# Patient Record
Sex: FEMALE | Race: AMERICAN INDIAN OR ALASKA NATIVE | HISPANIC OR LATINO | Employment: FULL TIME | URBAN - METROPOLITAN AREA
[De-identification: names, ages, dates, MRNs, and addresses within clinical notes are randomized per-mention and may not be internally consistent; named-entity substitution may affect disease eponyms.]

---

## 2017-05-01 ENCOUNTER — TELEPHONE (OUTPATIENT)
Dept: FAMILY MEDICINE | Facility: CLINIC | Age: 52
End: 2017-05-01

## 2017-05-01 NOTE — TELEPHONE ENCOUNTER
5/1/2017     Call Regarding Preventive Health Screening Mammogram  Attempt 1     Message No longer FV due to insurance  Comments:         Outreach   SIMONA

## 2017-08-05 ENCOUNTER — APPOINTMENT (OUTPATIENT)
Dept: ULTRASOUND IMAGING | Facility: CLINIC | Age: 52
End: 2017-08-05
Attending: INTERNAL MEDICINE
Payer: COMMERCIAL

## 2017-08-05 ENCOUNTER — HOSPITAL ENCOUNTER (EMERGENCY)
Facility: CLINIC | Age: 52
Discharge: HOME | End: 2017-08-05
Attending: INTERNAL MEDICINE | Admitting: INTERNAL MEDICINE
Payer: COMMERCIAL

## 2017-08-05 VITALS
HEART RATE: 66 BPM | SYSTOLIC BLOOD PRESSURE: 137 MMHG | BODY MASS INDEX: 24.8 KG/M2 | RESPIRATION RATE: 14 BRPM | WEIGHT: 156 LBS | DIASTOLIC BLOOD PRESSURE: 87 MMHG | TEMPERATURE: 96.6 F | OXYGEN SATURATION: 98 %

## 2017-08-05 DIAGNOSIS — R25.2 THIGH CRAMP: ICD-10-CM

## 2017-08-05 DIAGNOSIS — M79.652 LEFT THIGH PAIN: ICD-10-CM

## 2017-08-05 LAB
ANION GAP SERPL CALCULATED.3IONS-SCNC: 7 MMOL/L (ref 3–14)
BASOPHILS # BLD AUTO: 0 10E9/L (ref 0–0.2)
BASOPHILS NFR BLD AUTO: 0.2 %
BUN SERPL-MCNC: 11 MG/DL (ref 7–30)
CALCIUM SERPL-MCNC: 8.5 MG/DL (ref 8.5–10.1)
CHLORIDE SERPL-SCNC: 110 MMOL/L (ref 94–109)
CO2 SERPL-SCNC: 26 MMOL/L (ref 20–32)
CREAT SERPL-MCNC: 0.82 MG/DL (ref 0.52–1.04)
DIFFERENTIAL METHOD BLD: ABNORMAL
EOSINOPHIL # BLD AUTO: 0.1 10E9/L (ref 0–0.7)
EOSINOPHIL NFR BLD AUTO: 1.4 %
ERYTHROCYTE [DISTWIDTH] IN BLOOD BY AUTOMATED COUNT: 14.1 % (ref 10–15)
GFR SERPL CREATININE-BSD FRML MDRD: 73 ML/MIN/1.7M2
GLUCOSE SERPL-MCNC: 101 MG/DL (ref 70–99)
HCT VFR BLD AUTO: 36.5 % (ref 35–47)
HGB BLD-MCNC: 11.7 G/DL (ref 11.7–15.7)
IMM GRANULOCYTES # BLD: 0 10E9/L (ref 0–0.4)
IMM GRANULOCYTES NFR BLD: 0.5 %
INR PPP: 1 (ref 0.86–1.14)
LYMPHOCYTES # BLD AUTO: 1.7 10E9/L (ref 0.8–5.3)
LYMPHOCYTES NFR BLD AUTO: 19.6 %
MAGNESIUM SERPL-MCNC: 2 MG/DL (ref 1.6–2.3)
MCH RBC QN AUTO: 25.1 PG (ref 26.5–33)
MCHC RBC AUTO-ENTMCNC: 32.1 G/DL (ref 31.5–36.5)
MCV RBC AUTO: 78 FL (ref 78–100)
MONOCYTES # BLD AUTO: 0.5 10E9/L (ref 0–1.3)
MONOCYTES NFR BLD AUTO: 5.9 %
NEUTROPHILS # BLD AUTO: 6.1 10E9/L (ref 1.6–8.3)
NEUTROPHILS NFR BLD AUTO: 72.4 %
NRBC # BLD AUTO: 0 10*3/UL
NRBC BLD AUTO-RTO: 0 /100
PLATELET # BLD AUTO: 204 10E9/L (ref 150–450)
POTASSIUM SERPL-SCNC: 3.8 MMOL/L (ref 3.4–5.3)
RBC # BLD AUTO: 4.67 10E12/L (ref 3.8–5.2)
SODIUM SERPL-SCNC: 143 MMOL/L (ref 133–144)
WBC # BLD AUTO: 8.5 10E9/L (ref 4–11)

## 2017-08-05 PROCEDURE — 80048 BASIC METABOLIC PNL TOTAL CA: CPT | Performed by: INTERNAL MEDICINE

## 2017-08-05 PROCEDURE — 99284 EMERGENCY DEPT VISIT MOD MDM: CPT | Mod: 25

## 2017-08-05 PROCEDURE — 83735 ASSAY OF MAGNESIUM: CPT | Performed by: INTERNAL MEDICINE

## 2017-08-05 PROCEDURE — 85025 COMPLETE CBC W/AUTO DIFF WBC: CPT | Performed by: INTERNAL MEDICINE

## 2017-08-05 PROCEDURE — 93971 EXTREMITY STUDY: CPT | Mod: LT

## 2017-08-05 PROCEDURE — 85610 PROTHROMBIN TIME: CPT | Performed by: INTERNAL MEDICINE

## 2017-08-05 PROCEDURE — 99283 EMERGENCY DEPT VISIT LOW MDM: CPT | Mod: Z6 | Performed by: INTERNAL MEDICINE

## 2017-08-05 ASSESSMENT — ENCOUNTER SYMPTOMS
CHILLS: 0
PALPITATIONS: 0
BACK PAIN: 0
WHEEZING: 0
WEAKNESS: 0
CHEST TIGHTNESS: 0
DIFFICULTY URINATING: 0
SHORTNESS OF BREATH: 0
COUGH: 0
FEVER: 0
ADENOPATHY: 0
CONFUSION: 0
NUMBNESS: 0
ABDOMINAL PAIN: 0
NAUSEA: 0

## 2017-08-05 NOTE — ED AVS SNAPSHOT
[facility], Emergency Department    [address]    [place] [address]    Phone:  [phone]    Fax:  [phone]                                       Lucretia Patterson   MRN: [id number]    Department:  [facility], Emergency Department   Date of Visit:  8/5/2017           Patient Information     Date Of Birth          1965        Your diagnoses for this visit were:     Thigh cramp        You were seen by Korey Cardozo MD.      Follow-up Information     Follow up with Reema Mathew APRN CNP.    Specialty:  Nurse Practitioner    Contact information:    [facility]  [address]  [place] [id number] [phone]          Discharge Instructions       Ibuprofen (advil) 400-600 mg every 6 hours as needed for pain.  Follow up with your doctors at [facility].  Return for new or worsening symptoms, leg swelling, chest pain, shortness of breath, or palpitations.    24 Hour Appointment Hotline       To make an appointment at any [facility], call [phone] ([phone]). If you don't have a family doctor or clinic, we will help you find one. [facility] are conveniently located to serve the needs of you and your family.             Review of your medicines      Our records show that you are taking the medicines listed below. If these are incorrect, please call your family doctor or clinic.        Dose / Directions Last dose taken    amoxicillin 500 MG capsule   Commonly known as:  AMOXIL   Dose:  1000 mg   Quantity:  54 capsule        Take 2 capsules (1,000 mg) by mouth 2 times daily   Refills:  0        bisacodyl 10 MG Suppository   Commonly known as:  DULCOLAX   Dose:  10 mg   Quantity:  25 suppository        Place 1 suppository (10 mg) rectally daily as needed for constipation   Refills:  1        cholecalciferol 1000 UNIT tablet   Commonly known as:  vitamin D   Dose:  1000 Units   Quantity:  100 tablet        Take 1  tablet (1,000 Units) by mouth 2 times daily   Refills:  3        clarithromycin 500 MG tablet   Commonly known as:  BIAXIN   Dose:  500 mg   Quantity:  28 tablet        Take 1 tablet (500 mg) by mouth 2 times daily   Refills:  0        FISH OIL PO        Take  by mouth daily. Fish oil with vitamin D   Refills:  0        LANsoprazole 30 MG CR capsule   Commonly known as:  PREVACID   Dose:  30 mg   Quantity:  28 capsule        Take 1 capsule (30 mg) by mouth 2 times daily Take 30-60 minutes before a meal.   Refills:  0        PROBIOTIC DAILY PO        Refills:  0        TURMERIC PO        Refills:  0                Procedures and tests performed during your visit     Basic metabolic panel    CBC with platelets differential    INR    Magnesium    US Lower Extremity Venous Duplex Left      Orders Needing Specimen Collection     None      Pending Results     No orders found from 8/3/2017 to 8/6/2017.            Pending Culture Results     No orders found from 8/3/2017 to 8/6/2017.            Pending Results Instructions     If you had any lab results that were not finalized at the time of your Discharge, you can call the ED Lab Result RN at [phone]. You will be contacted by this team for any positive Lab results or changes in treatment. The nurses are available 7 days a week from 10A to 6:30P.  You can leave a message 24 hours per day and they will return your call.        Thank you for choosing [facility]       Thank you for choosing [facility] for your care. Our goal is always to provide you with excellent care. Hearing back from our patients is one way we can continue to improve our services. Please take a few minutes to complete the written survey that you may receive in the mail after you visit with us. Thank you!        [employer] Information     [employer] gives you secure access to your electronic health record. If you see a primary care provider, you can also send messages to your care team and make appointments. If  you have questions, please call your primary care clinic.  If you do not have a primary care provider, please call [phone] and they will assist you.        Care EveryWhere ID     This is your Care EveryWhere ID. This could be used by other organizations to access your [facility] medical records  [id number]        Equal Access to Services     JUANY MANN : Doug Do, miki martinez, nidia fernandez. So [facility] [phone].    ATENCIÓN: Si habla español, tiene a arenas disposición servicios gratuitos de asistencia lingüística. Llame al [phone].    We comply with applicable federal civil rights laws and [place] laws. We do not discriminate on the basis of race, color, national origin, age, disability sex, sexual orientation or gender identity.            After Visit Summary       This is your record. Keep this with you and show to your community pharmacist(s) and doctor(s) at your next visit.

## 2017-08-05 NOTE — ED AVS SNAPSHOT
[facility], Emergency Department    [address]    [place] [address]    Phone:  [phone]    Fax:  [phone]                                       Lucretia Patterson   MRN: [id number]    Department:  [facility], Emergency Department   Date of Visit:  8/5/2017           After Visit Summary Signature Page     I have received my discharge instructions, and my questions have been answered. I have discussed any challenges I see with this plan with the nurse or doctor.    ..........................................................................................................................................  Patient/Patient Representative Signature      ..........................................................................................................................................  Patient Representative Print Name and Relationship to Patient    ..................................................               ................................................  Date                                            Time    ..........................................................................................................................................  Reviewed by Signature/Title    ...................................................              ..............................................  Date                                                            Time

## 2017-08-06 NOTE — ED PROVIDER NOTES
"  History     Chief Complaint   Patient presents with     Leg Pain     Onset yesterday with left upper leg pain, \"spasms,\" here to rule out blood clot, recently stopped tamoxifen.     HPI  Lucretia Patterson is a 52 year old female who presents with left thigh cramps and spasms for a couple of days. She states she has a recent history of breast cancer diagnosis treated at [facility]. She had a left breast lumpectomy and sentinel lymph node dissection in May, followed by 4 weeks of radiation, then Tamoxifen for the past 2 months. She stopped Tamoxifen last week due to side effects (UTI and respiratory infections). She has no thigh, calf or foot swelling. She has no chest pain, palpitations or shortness of breath. She has no fever, chills or sweats.     PAST MEDICAL HISTORY:   Past Medical History:   Diagnosis Date     Cancer (H)      Constipation      Other anxiety states      Rosacea        PAST SURGICAL HISTORY:   Past Surgical History:   Procedure Laterality Date     CARDIAC EVENT MONITOR - PEDS/ADULT       COLONOSCOPY  7/2010    Polyp removed.  repeat 3 y     COLONOSCOPY  4/16/2014    Procedure: COMBINED COLONOSCOPY, SINGLE BIOPSY/POLYPECTOMY BY BIOPSY;  Surgeon: Luciano Braga MD;  Location:  GI     COLONOSCOPY N/A 12/22/2016    Procedure: COLONOSCOPY;  Surgeon: Uri Guillen MD;  Location:  GI     ESOPHAGOSCOPY, GASTROSCOPY, DUODENOSCOPY (EGD), COMBINED N/A 12/22/2016    Procedure: COMBINED ESOPHAGOSCOPY, GASTROSCOPY, DUODENOSCOPY (EGD);  Surgeon: Uri Guillen MD;  Location:  GI     ESOPHAGOSCOPY, GASTROSCOPY, DUODENOSCOPY (EGD), COMBINED N/A 12/22/2016    Procedure: COMBINED ESOPHAGOSCOPY, GASTROSCOPY, DUODENOSCOPY (EGD), BIOPSY SINGLE OR MULTIPLE;  Surgeon: Uri Guillen MD;  Location:  GI     SOFT TISSUE SURGERY       SURGICAL HISTORY OF -       Dulce's tendon repair R        FAMILY HISTORY:   Family History   Problem Relation Age of Onset "     HEART DISEASE Paternal Grandfather      CEREBROVASCULAR DISEASE Paternal Grandmother      DIABETES Paternal Grandmother      DIABETES Mother      CANCER Mother 73     small intestinal cancer     Psychotic Disorder Mother      Gynecology Mother      cyst on ovary     Hypertension Father      C.A.D. Father      heart bypass double     DIABETES Father      Cancer - colorectal Father 69     CANCER Paternal Aunt      stomach       SOCIAL HISTORY:   Social History   Substance Use Topics     Smoking status: Former Smoker     Types: Cigarettes     Quit date: 12/30/2010     Smokeless tobacco: Never Used      Comment: 1/2 cigevery other day     Alcohol use Yes      Comment: 1/2 glass wine on occasion         I have reviewed the Medications, Allergies, Past Medical and Surgical History, and Social History in the Epic system.    Review of Systems   Constitutional: Negative for chills and fever.   HENT: Negative for congestion.    Eyes: Negative for visual disturbance.   Respiratory: Negative for cough, chest tightness, shortness of breath and wheezing.    Cardiovascular: Negative for chest pain, palpitations and leg swelling.   Gastrointestinal: Negative for abdominal pain and nausea.   Genitourinary: Negative for difficulty urinating.   Musculoskeletal: Negative for back pain.   Skin: Negative for rash.   Neurological: Negative for weakness and numbness.   Hematological: Negative for adenopathy.   Psychiatric/Behavioral: Negative for confusion.       Physical Exam   BP: 145/83  Pulse: 74  Heart Rate: 74  Temp: 96.6  F (35.9  C)  Resp: 16  Weight: 70.8 kg (156 lb)  SpO2: 99 %  Physical Exam   Constitutional: She is oriented to person, place, and time. She appears well-developed and well-nourished. No distress.   HENT:   Head: Normocephalic and atraumatic.   Right Ear: External ear normal.   Left Ear: External ear normal.   Mouth/Throat: Oropharynx is clear and moist.   Eyes: Conjunctivae and EOM are normal. Pupils are  equal, round, and reactive to light. No scleral icterus.   Neck: Normal range of motion. Neck supple.   Cardiovascular: Normal rate, regular rhythm and normal heart sounds.    No murmur heard.  Pulmonary/Chest: Effort normal and breath sounds normal. She has no wheezes. She has no rales.   Abdominal: Soft. Bowel sounds are normal. There is no tenderness. There is no rebound.   Musculoskeletal: She exhibits no edema or tenderness.   Neurological: She is alert and oriented to person, place, and time. She displays normal reflexes. No cranial nerve deficit. Coordination normal.   Skin: Skin is warm and dry.   Psychiatric: She has a normal mood and affect. Her behavior is normal.   Nursing note and vitals reviewed.      ED Course     ED Course     Procedures        Labs/Imaging    Results for orders placed or performed during the hospital encounter of 08/05/17 (from the past 24 hour(s))   CBC with platelets differential   Result Value Ref Range    WBC 8.5 4.0 - 11.0 10e9/L    RBC Count 4.67 3.8 - 5.2 10e12/L    Hemoglobin 11.7 11.7 - 15.7 g/dL    Hematocrit 36.5 35.0 - 47.0 %    MCV 78 78 - 100 fl    MCH 25.1 (L) 26.5 - 33.0 pg    MCHC 32.1 31.5 - 36.5 g/dL    RDW 14.1 10.0 - 15.0 %    Platelet Count 204 150 - 450 10e9/L    Diff Method Automated Method     % Neutrophils 72.4 %    % Lymphocytes 19.6 %    % Monocytes 5.9 %    % Eosinophils 1.4 %    % Basophils 0.2 %    % Immature Granulocytes 0.5 %    Nucleated RBCs 0 0 /100    Absolute Neutrophil 6.1 1.6 - 8.3 10e9/L    Absolute Lymphocytes 1.7 0.8 - 5.3 10e9/L    Absolute Monocytes 0.5 0.0 - 1.3 10e9/L    Absolute Eosinophils 0.1 0.0 - 0.7 10e9/L    Absolute Basophils 0.0 0.0 - 0.2 10e9/L    Abs Immature Granulocytes 0.0 0 - 0.4 10e9/L    Absolute Nucleated RBC 0.0    Basic metabolic panel   Result Value Ref Range    Sodium 143 133 - 144 mmol/L    Potassium 3.8 3.4 - 5.3 mmol/L    Chloride 110 (H) 94 - 109 mmol/L    Carbon Dioxide 26 20 - 32 mmol/L    Anion Gap 7 3 - 14  mmol/L    Glucose 101 (H) 70 - 99 mg/dL    Urea Nitrogen 11 7 - 30 mg/dL    Creatinine 0.82 0.52 - 1.04 mg/dL    GFR Estimate 73 >60 mL/min/1.7m2    GFR Estimate If Black 88 >60 mL/min/1.7m2    Calcium 8.5 8.5 - 10.1 mg/dL   Magnesium   Result Value Ref Range    Magnesium 2.0 1.6 - 2.3 mg/dL   INR   Result Value Ref Range    INR 1.00 0.86 - 1.14   US Lower Extremity Venous Duplex Left    Narrative    US LOWER EXTREMITY VENOUS DUPLEX LEFT  8/5/2017 10:29 PM    HISTORY:  left thigh pain    TECHNIQUE:  Venous Doppler US including color flow and Doppler  waveform analysis.    FINDINGS: No thrombus was observed and there was normal  compressibility, phasic flow, and augmentation.       Impression    IMPRESSION: No evidence of  left lower extremity deep venous  thrombosis.    KATIE ROMERO MD         Assessments & Plan (with Medical Decision Making)   Impression:  Young woman with recent treatment for local breast cancer in the left breast with adjuvant radiation and tamoxifen.  She presents with left thigh cramps since discontinuing Tamoxifen.. There is no evidence of DVT. Her symptoms are most likely related to transient tissue fluid shift related to discontinuation of the hormonal therapy. No DVT on venous doppler US and normal electrolytes.    I have reviewed the nursing notes.    I have reviewed the findings, diagnosis, plan and need for follow up with the patient.    New Prescriptions    No medications on file       Final diagnoses:   Thigh cramp       8/5/2017   [facility], [place], EMERGENCY DEPARTMENT     Korey Cardozo MD  08/05/17 5008

## 2017-12-23 ENCOUNTER — HEALTH MAINTENANCE LETTER (OUTPATIENT)
Age: 52
End: 2017-12-23

## 2019-10-02 ENCOUNTER — HEALTH MAINTENANCE LETTER (OUTPATIENT)
Age: 54
End: 2019-10-02

## 2019-12-16 ENCOUNTER — HEALTH MAINTENANCE LETTER (OUTPATIENT)
Age: 54
End: 2019-12-16

## 2021-01-15 ENCOUNTER — HEALTH MAINTENANCE LETTER (OUTPATIENT)
Age: 56
End: 2021-01-15

## 2021-09-04 ENCOUNTER — HEALTH MAINTENANCE LETTER (OUTPATIENT)
Age: 56
End: 2021-09-04

## 2021-10-30 ENCOUNTER — HEALTH MAINTENANCE LETTER (OUTPATIENT)
Age: 56
End: 2021-10-30

## 2022-02-19 ENCOUNTER — HEALTH MAINTENANCE LETTER (OUTPATIENT)
Age: 57
End: 2022-02-19

## 2022-10-22 ENCOUNTER — HEALTH MAINTENANCE LETTER (OUTPATIENT)
Age: 57
End: 2022-10-22

## 2023-04-01 ENCOUNTER — HEALTH MAINTENANCE LETTER (OUTPATIENT)
Age: 58
End: 2023-04-01

## 2023-11-05 ENCOUNTER — HEALTH MAINTENANCE LETTER (OUTPATIENT)
Age: 58
End: 2023-11-05

## 2024-04-03 ENCOUNTER — TRANSFERRED RECORDS (OUTPATIENT)
Dept: HEALTH INFORMATION MANAGEMENT | Facility: CLINIC | Age: 59
End: 2024-04-03
Payer: COMMERCIAL

## 2024-04-03 ENCOUNTER — TRANSCRIBE ORDERS (OUTPATIENT)
Dept: OTHER | Age: 59
End: 2024-04-03

## 2024-04-03 DIAGNOSIS — M89.9 BONE LESION: Primary | ICD-10-CM

## 2024-04-03 DIAGNOSIS — R10.31 RIGHT LOWER QUADRANT PAIN: ICD-10-CM

## 2024-04-04 ENCOUNTER — PATIENT OUTREACH (OUTPATIENT)
Dept: ONCOLOGY | Facility: CLINIC | Age: 59
End: 2024-04-04
Payer: COMMERCIAL

## 2024-04-04 ENCOUNTER — TRANSCRIBE ORDERS (OUTPATIENT)
Dept: OTHER | Age: 59
End: 2024-04-04

## 2024-04-04 ENCOUNTER — PRE VISIT (OUTPATIENT)
Dept: ONCOLOGY | Facility: CLINIC | Age: 59
End: 2024-04-04
Payer: COMMERCIAL

## 2024-04-04 DIAGNOSIS — C90.00 MULTIPLE MYELOMA NOT HAVING ACHIEVED REMISSION (H): Primary | ICD-10-CM

## 2024-04-04 DIAGNOSIS — M89.9 BONE LESION: ICD-10-CM

## 2024-04-04 NOTE — PROGRESS NOTES
New Patient Oncology Nurse Navigator Note     Referring provider: Self Referral      Referring Clinic/Organization: NA     Referred to (specialty:) Hematology/Oncology     Requested provider (if applicable): NA     Date Referral Received: April 4, 2024     Evaluation for:    M89.9 (ICD-10-CM) - Bone lesion   C90.00 (ICD-10-CM) - Multiple myeloma not having achieved remission (H)     Clinical History (per Nurse review of records provided):      Patient was seen by Dr. Ananda Linares at [facility] [place] on 4/3/24 for evaluation of right groin and upper right thigh pain. Imaging done showing:    Imaging:  Exam: X ray: Right hip  Location: [facility]  Date: 4/3/2024  Interpretation: No fracture. Radiology saw a hypolucent lesion in the right acetabulum.  I ordered and independently read and reviewed the radiographs above; the results were discussed with the patient.    MR Hip Rt WO IV Cont  EXAM: MR HIP RT WO IV CONT  LOCATION: [facility]  DATE: 4/3/2024    INDICATION: Right hip pain / asymmetric lucency within the right ischium and right acetabular region which may represent an underlying bone lesion. Right lower quadrant pain.  COMPARISON: Radiographs from 04/03/2024.  TECHNIQUE: Unenhanced.    FINDINGS:    RIGHT HIP:  -Labrum: Posterosuperior and anterosuperior labral chondral separation. No paralabral cyst.  -Cartilage: Normal.  -Joint space: No effusion or synovitis.  -Joint capsule/ligaments: Intact joint capsule. Ligamentum teres is intact.  -Morphology: Alpha Angle is normal. No bony morphologic changes associated with femoroacetabular impingement.    MUSCLES AND TENDONS:  -Gluteal: No tendon tear or tendinopathy. No trochanteric bursitis.  -Proximal hamstring: No tendon tear or tendinopathy.  -Iliopsoas: No tendon tear or tendinopathy. No bursitis.  -Rectus femoris origin: No tear or tendinopathy.    BONES:  -There are lytic bone lesions diffusely throughout the visualized bones. There is a  large confluent lesion involving the right acetabulum measuring 2 cm transverse by 6.5 cm AP by 5 cm cephalocaudal. This is mildly expansile and breaks through the quadrilateral plate cortex and extends slightly into the space deep to the obturator internus muscle. No definite pathologic fracture.  -Lytic lesions scattered elsewhere measure up to 2 cm in diameter.    SOFT TISSUES:  -Normal muscle bulk. No acute muscular injury.    INTRA-PELVIC CONTENTS:  -Visualized portions are normal.    IMPRESSION:  1. Diffuse lytic bone lesions, consistent with metastatic disease or multiple myeloma. The largest lesion is mildly expansile and breaks through the quadrilateral plate cortex and extends slightly into the adjacent soft tissues. No definite pathologic fracture.  2. Right acetabular labral chondral separation.    XR Pelvis W Rt Lateral Hip  EXAM: XR PELVIS W RT LATERAL HIP  LOCATION: [facility]  DATE: 4/3/2024    INDICATION: R groin pain, Right lower quadrant pain  COMPARISON: None.    IMPRESSION: Both hips negative for fracture or dislocation. Pelvis negative for fracture. There is some asymmetric lucency within the right ischium and right acetabular region which may represent an underlying bone lesion. No pathologic fractures identified but follow-up examination or consideration of MRI is recommended.      Breast Cancer History:    Diagnosis:   Stage IB (pT1c pN1mi, M0) infiltrating ductal carcinoma of the left breast, grade II.  ER high positive, DE low positive, her 2 sepideh negative by FISH, Oncotype DX score 16, indicating an 11% risk of distant recurrence in 10 years     Treatment:   1.  Left partial mastectomy 03/09/2017  2.  Adjuvant radiation 04/17/2017 - 05/08/2017 total dose 4256 cGy  3.  Tamoxifen 20 mg daily 05/2017 - 07/2017. Discontinued due to severe fatigue.    Discharged from Hematology/Oncology clinic with Dr. Mansfield with  on 4/27/2022.     Records Location: [facility] EveryWhere  Records with  [facility]/Dr Ananda Linares  Previous breast records [facility]/Dr Klever Mansfield     Records Needed: NA     Additional testing needed prior to consult: ?    Payor: [employer] / Plan: [employer] FULLY INSURED / Product Type: HMO /     April 4, 2024  Referral received and message sent to [employer] to re-transcribe referral to orthopedic oncology.     Sia MIRELESN, RN   Oncology Nurse Navigator   [facility]   [phone] / [phone]

## 2024-04-06 NOTE — TELEPHONE ENCOUNTER
Action April 6, 2024 12:46 PM [place]   Action Taken Sent a request to  for imaging.       DIAGNOSIS:   Bone lesion [M89.9]  - Primary  Right lower quadrant pain [R10.31]   APPOINTMENT DATE: 04/09/2024   NOTES STATUS DETAILS   OFFICE NOTE from referring provider Care Everywhere 04/03/2024 - Ananda Linares MD -  [facility]   OFFICE NOTE from other specialist Care Everywhere 10/04/2023 - Sharon Patino MD - [facility]    [facility]    04/27/2022 - Klever Mansfield MD - [facility] [place] [facility]   MRI PACS [facility]:  04/03/2024 - RT Hip  09/14/2023, 09/02/2019 - L Spine     CT SCAN PACS [facility]:  02/10/2020 - Chest/Abd/Pel     XRAYS (IMAGES & REPORTS) PACS [facility]:  04/03/2024 - RT Hip/Pelvis

## 2024-04-09 ENCOUNTER — VIRTUAL VISIT (OUTPATIENT)
Dept: ORTHOPEDICS | Facility: CLINIC | Age: 59
End: 2024-04-09
Payer: COMMERCIAL

## 2024-04-09 ENCOUNTER — PRE VISIT (OUTPATIENT)
Dept: ORTHOPEDICS | Facility: CLINIC | Age: 59
End: 2024-04-09

## 2024-04-09 VITALS — HEIGHT: 67 IN | WEIGHT: 166 LBS | BODY MASS INDEX: 26.06 KG/M2

## 2024-04-09 DIAGNOSIS — C79.51 METASTATIC BONE TUMOR (H): Primary | ICD-10-CM

## 2024-04-09 PROCEDURE — 99203 OFFICE O/P NEW LOW 30 MIN: CPT | Mod: 95 | Performed by: ORTHOPAEDIC SURGERY

## 2024-04-09 RX ORDER — DULOXETIN HYDROCHLORIDE 20 MG/1
1 CAPSULE, DELAYED RELEASE ORAL DAILY
COMMUNITY
Start: 2024-03-05 | End: 2025-03-05

## 2024-04-09 RX ORDER — HYDROXYZINE HYDROCHLORIDE 10 MG/1
20-30 TABLET, FILM COATED ORAL
COMMUNITY
Start: 2023-08-11 | End: 2024-08-10

## 2024-04-09 RX ORDER — LORAZEPAM 0.5 MG/1
1 TABLET ORAL EVERY 8 HOURS PRN
COMMUNITY
Start: 2024-01-24

## 2024-04-09 RX ORDER — CYCLOBENZAPRINE HCL 10 MG
10 TABLET ORAL
COMMUNITY
Start: 2024-04-03 | End: 2024-05-03

## 2024-04-09 RX ORDER — FERROUS SULFATE 325(65) MG
325 TABLET ORAL
COMMUNITY
Start: 2023-08-11

## 2024-04-09 ASSESSMENT — PAIN SCALES - GENERAL: PAINLEVEL: NO PAIN (1)

## 2024-04-09 NOTE — PROGRESS NOTES
Virtual Visit Details    Type of service:  Video Visit     Impression: Suspect metastatic disease involving the right acetabulum and adjacent bones.  Currently being evaluated by oncology team or [facility].    Plan: 1.  Patient will contact us after her evaluation at [facility] is complete we will access her records in the MyChart and we will call her back with a follow-up appointment date based on those findings.  2.  Patient was instructed to use a cane on the left side or crutches at all times when bearing weight.    Patient is a 59-year-old female seen for right hip pain.  She was recently seen at [facility].  She reports the pain is been several weeks in duration.  She also complains of rib pain which is relatively new.  She is very active and feels some of her symptoms are related to her activity.    Her past history is remarkable for breast cancer treated in 2017 for what sounds like a low-grade neoplasm.  Details were not provided.    Her medications are detailed in electronic health record.    I reviewed her recent x-ray and MRI scan.  The x-ray shows a radiolucency in the medial portion of the right hip encompassing the medial acetabulum.  The MRI scan confirms the presence of this lesion and shows a cortical breakthrough along the medial wall.    I discussed the MRI Hany things with the patient and expressed concern that she most likely has a nondisplaced fracture and this is accountable for pain.    I explained to her she should continue her evaluation at [facility] and notify us when that has been completed and diagnosis is established.  At that point we will contact her about follow-up for her right hip and skeleton in general.    I advised that she use crutches or cane at all times when up.  She understands this.  She was disappointed but will comply.        This was a video visit began at 1:03 PM and ended 1:34 PM.  Total lengthof the visit was greater than 30 minutes.

## 2024-04-09 NOTE — NURSING NOTE
Patient stated high allergy to - Citalopram (Celexa)-heart palpitation, head felt like splitting open.      Is the patient currently in the state of [place]? YES    Visit mode:VIDEO    If the visit is dropped, the patient can be reconnected by: VIDEO VISIT: Send to e-mail at: [email or web address]    Will anyone else be joining the visit? NO  (If patient encounters technical issues they should call [phone])    How would you like to obtain your AVS? MyChart    Are changes needed to the allergy or medication list? Yes ADD ALLERGY- Patient stated high allergy to - Citalopram (Celexa)-heart palpitation, head felt like splitting open.    Are refills needed on medications prescribed by this physician?     Reason for visit: Consult (Wondering why got referred )    Barbara CASTILLO

## 2024-04-09 NOTE — PATIENT INSTRUCTIONS
Impression: Suspect metastatic disease involving the right acetabulum and adjacent bones.  Currently being evaluated by oncology team or [facility].    Plan: 1.  Patient will contact us after her evaluation at [facility] is complete we will access her records in the [employer] and we will call her back with a follow-up appointment date based on those findings.  2.  Patient was instructed to use a cane on the left side or crutches at all times when bearing weight.

## 2024-04-09 NOTE — LETTER
4/9/2024         RE: Lucretia Patterson  [address] [place] [address]        Dear Colleague,    Thank you for referring your patient, Lucretia Patterson, to the [facility] [place]. Please see a copy of my visit note below.    Virtual Visit Details    Type of service:  Video Visit     Impression: Suspect metastatic disease involving the right acetabulum and adjacent bones.  Currently being evaluated by oncology team or [facility].    Plan: 1.  Patient will contact us after her evaluation at [facility] is complete we will access her records in the [facility] and we will call her back with a follow-up appointment date based on those findings.  2.  Patient was instructed to use a cane on the left side or crutches at all times when bearing weight.    Patient is a 59-year-old female seen for right hip pain.  She was recently seen at [facility].  She reports the pain is been several weeks in duration.  She also complains of rib pain which is relatively new.  She is very active and feels some of her symptoms are related to her activity.    Her past history is remarkable for breast cancer treated in 2017 for what sounds like a low-grade neoplasm.  Details were not provided.    Her medications are detailed in electronic health record.    I reviewed her recent x-ray and MRI scan.  The x-ray shows a radiolucency in the medial portion of the right hip encompassing the medial acetabulum.  The MRI scan confirms the presence of this lesion and shows a cortical breakthrough along the medial wall.    I discussed the MRI Hany things with the patient and expressed concern that she most likely has a nondisplaced fracture and this is accountable for pain.    I explained to her she should continue her evaluation at [facility] and notify us when that has been completed and diagnosis is established.  At that point we will contact her about follow-up for her right hip and skeleton in general.    I advised that she  use crutches or cane at all times when up.  She understands this.  She was disappointed but will comply.    This was a video visit began at 1:03 PM and ended 1:34 PM.  Total lengthof the visit was greater than 30 minutes.        Porfirio Yee MD

## 2024-04-18 ENCOUNTER — TELEPHONE (OUTPATIENT)
Dept: ORTHOPEDICS | Facility: CLINIC | Age: 59
End: 2024-04-18
Payer: COMMERCIAL

## 2024-04-18 DIAGNOSIS — C79.51 METASTATIC BONE TUMOR (H): Primary | ICD-10-CM

## 2024-04-18 NOTE — TELEPHONE ENCOUNTER
[facility] Call Center    Phone Message    May a detailed message be left on voicemail: yes     Reason for Call: Other: Patient's physical therapist would like to know if patient is ok to do physical therapy and also they're wondering about weight bearing. OK to leave Khalida voicemail message if no answer. If ok for physical therapy please also send over order. Fax# [phone]     Action Taken: Message routed to:  [facility] ([facility]): Orthopedics    Travel Screening: Not Applicable

## 2024-04-23 ENCOUNTER — MYC MEDICAL ADVICE (OUTPATIENT)
Dept: ORTHOPEDICS | Facility: CLINIC | Age: 59
End: 2024-04-23
Payer: COMMERCIAL

## 2024-04-25 NOTE — TELEPHONE ENCOUNTER
Dr. Yee called and talked to pt. He answered pt's questions.       -Jose J, ATC- [facility]

## 2024-05-23 ENCOUNTER — TELEPHONE (OUTPATIENT)
Dept: OTHER | Age: 59
End: 2024-05-23

## 2024-05-23 NOTE — TELEPHONE ENCOUNTER
Order(s): Other:   Reason for requested: [facility] is calling for clarification on the order.  In the comments there are restrictions noted.  Pt is wanting to work on her R hip.  Is it ok to do gentle ROM of her R hip.  Is it ok to do gentle strengthen of the R hip in open chain or with table exercises.  Pt thought it was ok to walk w/o crutches a little bit.  Therapist is asking if pt can walk w/o assistive devices or are they needed as the order states during therapy.   Also, she is asking for a copy of Dr. Rutherford most recent visit notes.    Date needed: asap, pt coming in Tuesday AM next week  Provider name: DR. Yee    Could we send this information to you in [facility] or would you prefer to receive a phone call?:   Patient would prefer a phone call   Okay to leave a detailed message?: Yes at Other phone number:  Khalida with [facility] is at .  Verbal reply is ok or fax to .

## 2024-05-31 DIAGNOSIS — C79.51 METASTATIC BONE TUMOR (H): Primary | ICD-10-CM

## 2024-06-02 ENCOUNTER — HEALTH MAINTENANCE LETTER (OUTPATIENT)
Age: 59
End: 2024-06-02

## 2025-05-03 ENCOUNTER — HEALTH MAINTENANCE LETTER (OUTPATIENT)
Age: 60
End: 2025-05-03

## 2025-06-14 ENCOUNTER — HEALTH MAINTENANCE LETTER (OUTPATIENT)
Age: 60
End: 2025-06-14